# Patient Record
Sex: FEMALE | URBAN - METROPOLITAN AREA
[De-identification: names, ages, dates, MRNs, and addresses within clinical notes are randomized per-mention and may not be internally consistent; named-entity substitution may affect disease eponyms.]

---
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
## 2023-05-18 ENCOUNTER — APPOINTMENT (RX ONLY)
Dept: URBAN - METROPOLITAN AREA CLINIC 105 | Facility: CLINIC | Age: 18
Setting detail: DERMATOLOGY
End: 2023-05-18

## 2023-05-18 DIAGNOSIS — D18.0 HEMANGIOMA: ICD-10-CM

## 2023-05-18 DIAGNOSIS — L81.4 OTHER MELANIN HYPERPIGMENTATION: ICD-10-CM

## 2023-05-18 DIAGNOSIS — L81.3 CAFÉ AU LAIT SPOTS: ICD-10-CM

## 2023-05-18 DIAGNOSIS — L57.8 OTHER SKIN CHANGES DUE TO CHRONIC EXPOSURE TO NONIONIZING RADIATION: ICD-10-CM | Status: STABLE

## 2023-05-18 DIAGNOSIS — D22 MELANOCYTIC NEVI: ICD-10-CM

## 2023-05-18 DIAGNOSIS — Z87.2 PERSONAL HISTORY OF DISEASES OF THE SKIN AND SUBCUTANEOUS TISSUE: ICD-10-CM

## 2023-05-18 DIAGNOSIS — Z71.89 OTHER SPECIFIED COUNSELING: ICD-10-CM

## 2023-05-18 PROBLEM — D22.5 MELANOCYTIC NEVI OF TRUNK: Status: ACTIVE | Noted: 2023-05-18

## 2023-05-18 PROBLEM — D18.01 HEMANGIOMA OF SKIN AND SUBCUTANEOUS TISSUE: Status: ACTIVE | Noted: 2023-05-18

## 2023-05-18 PROBLEM — D22.61 MELANOCYTIC NEVI OF RIGHT UPPER LIMB, INCLUDING SHOULDER: Status: ACTIVE | Noted: 2023-05-18

## 2023-05-18 PROCEDURE — ? COUNSELING

## 2023-05-18 PROCEDURE — 99213 OFFICE O/P EST LOW 20 MIN: CPT

## 2023-05-18 PROCEDURE — ? SUNSCREEN RECOMMENDATIONS

## 2023-05-18 ASSESSMENT — LOCATION DETAILED DESCRIPTION DERM
LOCATION DETAILED: INFERIOR THORACIC SPINE
LOCATION DETAILED: RIGHT MEDIAL FOREHEAD
LOCATION DETAILED: EPIGASTRIC SKIN
LOCATION DETAILED: LEFT PROXIMAL DORSAL FOREARM
LOCATION DETAILED: INFERIOR MID FOREHEAD
LOCATION DETAILED: LEFT DISTAL DORSAL FOREARM
LOCATION DETAILED: RIGHT INFERIOR ANTERIOR NECK
LOCATION DETAILED: RIGHT PROXIMAL DORSAL FOREARM
LOCATION DETAILED: PERIUMBILICAL SKIN
LOCATION DETAILED: LEFT INFERIOR MEDIAL UPPER BACK
LOCATION DETAILED: RIGHT LATERAL BUTTOCK
LOCATION DETAILED: SUPERIOR THORACIC SPINE
LOCATION DETAILED: RIGHT DISTAL DORSAL FOREARM

## 2023-05-18 ASSESSMENT — LOCATION SIMPLE DESCRIPTION DERM
LOCATION SIMPLE: RIGHT ANTERIOR NECK
LOCATION SIMPLE: UPPER BACK
LOCATION SIMPLE: INFERIOR FOREHEAD
LOCATION SIMPLE: LEFT FOREARM
LOCATION SIMPLE: RIGHT FOREARM
LOCATION SIMPLE: LEFT UPPER BACK
LOCATION SIMPLE: RIGHT FOREHEAD
LOCATION SIMPLE: ABDOMEN
LOCATION SIMPLE: RIGHT BUTTOCK

## 2023-05-18 ASSESSMENT — LOCATION ZONE DERM
LOCATION ZONE: ARM
LOCATION ZONE: NECK
LOCATION ZONE: TRUNK
LOCATION ZONE: FACE

## 2023-05-18 NOTE — PROCEDURE: SUNSCREEN RECOMMENDATIONS
Detail Level: Generalized
General Sunscreen Counseling: I recommended a broad spectrum sunscreen with a SPF of 50 or higher.  I explained that SPF 30 sunscreens block approximately 97 percent of the sun's harmful rays.  Sunscreens should be applied at least 15 minutes prior to expected sun exposure and then every 2 hours after that as long as sun exposure continues. If swimming or exercising sunscreen should be reapplied every 45 minutes to an hour after getting wet or sweating.  One ounce, or the equivalent of a shot glass full of sunscreen, is adequate to protect the skin not covered by a bathing suit. I also recommended a lip balm with a sunscreen as well. Sun protective clothing can be used in lieu of sunscreen but must be worn the entire time you are exposed to the sun's rays. ABCDEs/monthly SSE/FSE at least annually.
Detail Level: Zone
Products Recommended: La Roche-Posay and Elta MD
General Sunscreen Counseling: I recommended a broad spectrum sunscreen with a SPF of 30 or higher.  I explained that SPF 30 sunscreens block approximately 97 percent of the sun's harmful rays.  Sunscreens should be applied at least 15 minutes prior to expected sun exposure and then every 2 hours after that as long as sun exposure continues. If swimming or exercising sunscreen should be reapplied every 45 minutes to an hour after getting wet or sweating.  One ounce, or the equivalent of a shot glass full of sunscreen, is adequate to protect the skin not covered by a bathing suit. I also recommended a lip balm with a sunscreen as well. Sun protective clothing can be used in lieu of sunscreen but must be worn the entire time you are exposed to the sun's rays.

## 2024-01-18 ENCOUNTER — OFFICE VISIT (OUTPATIENT)
Dept: ORTHOPEDIC SURGERY | Age: 19
End: 2024-01-18
Payer: COMMERCIAL

## 2024-01-18 VITALS — WEIGHT: 8 LBS | BODY MASS INDEX: 1.42 KG/M2 | HEIGHT: 63 IN

## 2024-01-18 DIAGNOSIS — M25.562 LEFT KNEE PAIN, UNSPECIFIED CHRONICITY: Primary | ICD-10-CM

## 2024-01-18 PROCEDURE — 99204 OFFICE O/P NEW MOD 45 MIN: CPT | Performed by: ORTHOPAEDIC SURGERY

## 2024-01-23 NOTE — PROGRESS NOTES
modification, anti-inflammatory medications, cortisone injection, physical therapy.  From a surgical standpoint we could consider knee arthroscopy for diagnostic and therapeutic purposes and then proceed with either meniscus repair or partial meniscectomy based on the appearance.  We discussed the pros and cons associate with all these options.  She like to think about these options and perhaps return to further discuss in the future.    Greater than 45 minutes were spent with this encounter.  Time spent included evaluating the patient's chart prior to arrival.  Evaluating the patient in the office including history, physical examination, imaging reviewing, and counseling on next steps.  Lastly, time was spent discussing orders with my staff as well as providing documentation in the chart.                    Wiley Shaffer MD            Orthopaedic Surgery Sports Medicine and Arthroscopy            [facility]            Team Physician [facility] ([place])      Disclaimer:  This note was dictated with voice recognition software.  Though review and correction are routine, we apologize for any errors.

## 2024-01-24 ENCOUNTER — OFFICE VISIT (OUTPATIENT)
Dept: ORTHOPEDIC SURGERY | Age: 19
End: 2024-01-24
Payer: COMMERCIAL

## 2024-01-24 ENCOUNTER — TELEPHONE (OUTPATIENT)
Dept: ORTHOPEDIC SURGERY | Age: 19
End: 2024-01-24

## 2024-01-24 VITALS — HEIGHT: 63 IN | BODY MASS INDEX: 23.04 KG/M2 | WEIGHT: 130 LBS

## 2024-01-24 DIAGNOSIS — M25.562 LEFT KNEE PAIN, UNSPECIFIED CHRONICITY: Primary | ICD-10-CM

## 2024-01-24 DIAGNOSIS — S83.242A ACUTE MEDIAL MENISCUS TEAR OF LEFT KNEE, INITIAL ENCOUNTER: Primary | ICD-10-CM

## 2024-01-24 PROCEDURE — 99215 OFFICE O/P EST HI 40 MIN: CPT | Performed by: ORTHOPAEDIC SURGERY

## 2024-01-24 RX ORDER — ALBUTEROL SULFATE 90 UG/1
AEROSOL, METERED RESPIRATORY (INHALATION)
COMMUNITY
Start: 2022-04-06

## 2024-01-24 NOTE — PROGRESS NOTES
2024     Reason for visit:  Left knee pain following injury on 2024    History of Present Illness:  The patient is a 18-year-old female who presents for evaluation of her left knee.  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  She reports on and off knee pain for 6 to 12 months with recent worsening.  She reports that she injured herself on a diving board on 2024.  Ever since then she has had increased amounts of medial knee pain.  The pain is made worse with diving and strenuous activities.  Occasional sense of catching.  Occasional swelling.    The patient returns for evaluation.  She does report reinjuring her knee within the past week.  She did twist her knee.  Ever since then she has had increasing muscle pain medially.  She has more difficulty with bearing weight.  She also reports increased evidence of instability, catching and locking.    Objective:  Ht 1.6 m (5' 3\")   Wt 59 kg (130 lb)   BMI 23.03 kg/m²      Physical Exam:  The patient is well-appearing and in no apparent distress  Examination of the left knee   There is a trace effusion, no gross deformity or skin changes  Range of motion reveals 0 to 130  Neg lachman, negative posterior drawer, no pain or laxity with varus or valgus stress at 0 degrees and 30 degrees of flexion  + medial joint line tenderness  5 out of 5 strength throughout distal muscle groups  Sensation is intact to light touch throughout all distributions  There is no calf swelling or tenderness  Palpable DP pulse, brisk cap refill, 2+ symmetric reflexes     Imagin view x-rays of the left knee were obtained the office today on 2024 and reviewed.  There is no fracture or dislocation.  No other abnormality.  MRI of the left knee was reviewed.  There is signal abnormality of the medial meniscus presents which may represent a tear versus intrasubstance degeneration      Assessment:  Left knee pain.  Concern for possible medial meniscus tear    Plan:  I had

## 2024-01-24 NOTE — PROGRESS NOTES
Name_______________________________________Printed:____________________  Date and time of surgery__2/2/2024__________0930____________Arrival Time:______0730__________   1. The instructions given regarding when and if a patient needs to stop oral intake prior to surgery varies.Follow the specific instructions you were given                  _x__Nothing to eat or to drink after Midnight the night before.                   ____Carbo loading or instructions will be given to select patients-if you have been given those instructions -please do the following                           The evening before your surgery after dinner before midnight drink 40 ounces of gatorade.If you are diabetic use sugar free.  The morning of surgery drink 40 ounces of water.This needs to be finished 3 hours prior to your surgery start time.    2. Take the following pills with a small sip of water on the morning of surgery___________________________________________________                  Do not take blood pressure medications ending in pril or sartan the abril prior to surgery or the morning of surgery. Dr Walsh's patient are not to take any medications the AM of surgery.         3. Aspirin, Ibuprofen, Advil, Naproxen, Vitamin E and other Anti-inflammatory products and supplements should be stopped for 5 -7days before surgery or as directed by your physician.   4. Check with your Doctor regarding stopping Plavix, Coumadin,Eliquis, Lovenox,Effient,Pradaxa,Xarelto, Fragmin or other blood thinners and follow their instructions.   5. Do not smoke, and do not drink any alcoholic beverages 24 hours prior to surgery.  This includes NA Beer.Refrain from the usage of any recreational drugs.   6. You may brush your teeth and gargle the morning of surgery.  DO NOT SWALLOW WATER   7. You MUST make arrangements for a responsible adult to stay on site while you are here and take you home after your surgery. You will not be allowed to leave alone or drive

## 2024-01-31 DIAGNOSIS — G89.18 POST-OP PAIN: Primary | ICD-10-CM

## 2024-01-31 RX ORDER — ASPIRIN 325 MG
325 TABLET ORAL DAILY
Qty: 14 TABLET | Refills: 0 | Status: SHIPPED | OUTPATIENT
Start: 2024-02-02

## 2024-01-31 RX ORDER — ONDANSETRON 4 MG/1
4 TABLET, FILM COATED ORAL EVERY 8 HOURS PRN
Qty: 21 TABLET | Refills: 0 | Status: SHIPPED | OUTPATIENT
Start: 2024-02-02

## 2024-01-31 RX ORDER — HYDROCODONE BITARTRATE AND ACETAMINOPHEN 5; 325 MG/1; MG/1
1 TABLET ORAL EVERY 4 HOURS PRN
Qty: 30 TABLET | Refills: 0 | Status: SHIPPED | OUTPATIENT
Start: 2024-02-02 | End: 2024-02-07

## 2024-02-01 NOTE — DISCHARGE INSTRUCTIONS
Orthopedic Surgery Discharge Instructions    Medications:   A pain medication has been prescribed for you.  Please take this as instructed by the pharmacist.  An anti-nausea medication has been prescribed for you to use as needed for nausea.  Either aspirin or a blood thinner medication may have been prescribed for you.  Please take this as instructed.    Activity:   weightbearing as tolerated.   Incision/dressings:  You may remove your dressing in 72 hours.  Until then the dressing needs to remain clean and dry.  Following removal dressing please apply dry dressing daily.  You may shower in 72 hours and allow the water to run over the incision.  However no submerging underwater or getting in pools.    Follow-up:  If you do not already have a postoperative follow-up appointment scheduled you should call to schedule an appointment within 10 to 14 days of surgery.    Emergency or after hours questions:  Please call the main call center at [phone] for all questions or concerns during evening and weekend hours.  The call center will direct your call to the on-call physician to answer your questions and concerns. During weekly office hours you may call my assistant, Kaitlin, at [phone].                           Wiley Shaffer MD            Orthopaedic Surgery Sports Medicine and Arthroscopy            [facility]            Team Physician [facility])      GENERAL SURGERY DISCHARGE INSTRUCTIONS    Follow your surgeons instructions.  Follow up with your surgeon as directed.  Observe the operative area for signs of excessive bleeding.If needed apply pressure,elevate if able and contact your surgeon.  Observe the operative site for any signs of infection- such as increased pain,redness,fever greater than 101 degrees,swelling, foul odor or drainage.Contact your surgeon  if any of these symptoms are present.  Keep operative site clean and dry.  Do not remove dressing unless instructed to by surgeon.  Apply ice as directed.  Avoid pulling,pushing or tugging to suture line.  If you become short of breath call your doctor or go to the ER.  Take medications as directed.  Pain medication should be taken with food.  Do not drive or operate machinery while taking narcotics.    ANESTHESIA DISCHARGE INSTRUCTIONS    Wear your seatbelt home.  You are under the influence of drugs-do not drink alcohol,drive,operate machinery,or make any important decisions or sign any legal documentsfor 24 hours  A responsible adult needs to be with you for 24 hours.  You may experience lightheadedness,dizziness,or sleepiness following surgery.  Rest at home today- increase activity as tolerated.  Progress slowly to a regular diet unless your physician has instructed you otherwise.Drink plenty of water.  If nausea becomes a problem call your physician.  Coughing,sore throat,and muscle aches are other side effects of anesthesia,and should improve with time.  Do not drive,operate machinery while taking narcotics.   Females of childbearing potential and on hormonal birth control, should use two forms of contraception following procedure if given a medication called sugammadex and/or emend. Additional contraception should be used for 7 days for sugammadex and/or 28 days for emend. These medications have a potential to reduce the effectiveness of hormonal birth control.

## 2024-02-01 NOTE — H&P
Reason for visit:  Left knee pain following injury on 2024     History of Present Illness:  The patient is a 18-year-old female who presents for evaluation of her left knee.  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  She reports on and off knee pain for 6 to 12 months with recent worsening.  She reports that she injured herself on a diving board on 2024.  Ever since then she has had increased amounts of medial knee pain.  The pain is made worse with diving and strenuous activities.  Occasional sense of catching.  Occasional swelling.     The patient returns for evaluation.  She does report reinjuring her knee within the past week.  She did twist her knee.  Ever since then she has had increasing muscle pain medially.  She has more difficulty with bearing weight.  She also reports increased evidence of instability, catching and locking.    Past Medical History:   Diagnosis Date    Asthma         Past Surgical History:   Procedure Laterality Date    RHINOPLASTY      TONSILLECTOMY           Objective:  Ht 1.6 m (5' 3\")   Wt 59 kg (130 lb)   BMI 23.03 kg/m²       Physical Exam:  The patient is well-appearing and in no apparent distress  CV-RRR  Pulm-CTAB    Examination of the left knee   There is a trace effusion, no gross deformity or skin changes  Range of motion reveals 0 to 130  Neg lachman, negative posterior drawer, no pain or laxity with varus or valgus stress at 0 degrees and 30 degrees of flexion  + medial joint line tenderness  5 out of 5 strength throughout distal muscle groups  Sensation is intact to light touch throughout all distributions  There is no calf swelling or tenderness  Palpable DP pulse, brisk cap refill, 2+ symmetric reflexes      Imagin view x-rays of the left knee were obtained the office today on 2024 and reviewed.  There is no fracture or dislocation.  No other abnormality.  MRI of the left knee was reviewed.  There is signal abnormality of the medial meniscus  presents which may represent a tear versus intrasubstance degeneration        Assessment:  Left knee pain.  Concern for possible medial meniscus tear     Plan:  I had a very long discussion with the patient.  We spent time reviewing imaging findings.  She does have worsening knee pain especially after this most recent event.  She also has joint line tenderness.  Whether or not the signal on the MRI represents a meniscus tear is to be determined.  Regardless treatment options include nonoperative as well as operative intervention.  From a nonoperative standpoint we could consider activity modification, anti-inflammatory medications, cortisone injection, physical therapy.  From a surgical standpoint we could consider knee arthroscopy for diagnostic and therapeutic purposes and then proceed with either meniscus repair or partial meniscectomy based on the appearance.  We discussed the pros and cons associate with all these options.  If we did proceed with repair of the meniscus then I would advocate for PRP injection for augmentation purposes.  I did tell the patient that there is a chance that we went in and the meniscus was not torn.  She is aware of this.  We thoroughly discussed the risk and benefits.  She does elect to proceed.

## 2024-02-02 ENCOUNTER — ANESTHESIA (OUTPATIENT)
Dept: OPERATING ROOM | Age: 19
End: 2024-02-02
Payer: COMMERCIAL

## 2024-02-02 ENCOUNTER — ANESTHESIA EVENT (OUTPATIENT)
Dept: OPERATING ROOM | Age: 19
End: 2024-02-02
Payer: COMMERCIAL

## 2024-02-02 ENCOUNTER — HOSPITAL ENCOUNTER (OUTPATIENT)
Age: 19
Setting detail: OUTPATIENT SURGERY
Discharge: HOME | End: 2024-02-02
Attending: ORTHOPAEDIC SURGERY | Admitting: ORTHOPAEDIC SURGERY
Payer: COMMERCIAL

## 2024-02-02 ENCOUNTER — TELEPHONE (OUTPATIENT)
Dept: ORTHOPEDIC SURGERY | Age: 19
End: 2024-02-02

## 2024-02-02 VITALS
SYSTOLIC BLOOD PRESSURE: 101 MMHG | WEIGHT: 141.13 LBS | DIASTOLIC BLOOD PRESSURE: 68 MMHG | HEART RATE: 73 BPM | HEIGHT: 63 IN | TEMPERATURE: 97.1 F | RESPIRATION RATE: 17 BRPM | OXYGEN SATURATION: 97 % | BODY MASS INDEX: 25.01 KG/M2

## 2024-02-02 LAB — HCG UR QL: NEGATIVE

## 2024-02-02 PROCEDURE — 84703 CHORIONIC GONADOTROPIN ASSAY: CPT

## 2024-02-02 PROCEDURE — 6370000000 HC RX 637 (ALT 250 FOR IP): Performed by: STUDENT IN AN ORGANIZED HEALTH CARE EDUCATION/TRAINING PROGRAM

## 2024-02-02 PROCEDURE — 3600000014 HC SURGERY LEVEL 4 ADDTL 15MIN: Performed by: ORTHOPAEDIC SURGERY

## 2024-02-02 PROCEDURE — 3700000000 HC ANESTHESIA ATTENDED CARE: Performed by: ORTHOPAEDIC SURGERY

## 2024-02-02 PROCEDURE — 6360000002 HC RX W HCPCS: Performed by: NURSE ANESTHETIST, CERTIFIED REGISTERED

## 2024-02-02 PROCEDURE — 7100000011 HC PHASE II RECOVERY - ADDTL 15 MIN: Performed by: ORTHOPAEDIC SURGERY

## 2024-02-02 PROCEDURE — 3700000001 HC ADD 15 MINUTES (ANESTHESIA): Performed by: ORTHOPAEDIC SURGERY

## 2024-02-02 PROCEDURE — 6360000002 HC RX W HCPCS: Performed by: STUDENT IN AN ORGANIZED HEALTH CARE EDUCATION/TRAINING PROGRAM

## 2024-02-02 PROCEDURE — 2500000003 HC RX 250 WO HCPCS: Performed by: ORTHOPAEDIC SURGERY

## 2024-02-02 PROCEDURE — 3600000004 HC SURGERY LEVEL 4 BASE: Performed by: ORTHOPAEDIC SURGERY

## 2024-02-02 PROCEDURE — 7100000000 HC PACU RECOVERY - FIRST 15 MIN: Performed by: ORTHOPAEDIC SURGERY

## 2024-02-02 PROCEDURE — 7100000010 HC PHASE II RECOVERY - FIRST 15 MIN: Performed by: ORTHOPAEDIC SURGERY

## 2024-02-02 PROCEDURE — 2580000003 HC RX 258: Performed by: NURSE ANESTHETIST, CERTIFIED REGISTERED

## 2024-02-02 PROCEDURE — 6360000002 HC RX W HCPCS: Performed by: ORTHOPAEDIC SURGERY

## 2024-02-02 PROCEDURE — 2709999900 HC NON-CHARGEABLE SUPPLY: Performed by: ORTHOPAEDIC SURGERY

## 2024-02-02 PROCEDURE — 7100000001 HC PACU RECOVERY - ADDTL 15 MIN: Performed by: ORTHOPAEDIC SURGERY

## 2024-02-02 PROCEDURE — 2500000003 HC RX 250 WO HCPCS: Performed by: NURSE ANESTHETIST, CERTIFIED REGISTERED

## 2024-02-02 PROCEDURE — 2580000003 HC RX 258: Performed by: ORTHOPAEDIC SURGERY

## 2024-02-02 RX ORDER — SODIUM CHLORIDE, SODIUM LACTATE, POTASSIUM CHLORIDE, CALCIUM CHLORIDE 600; 310; 30; 20 MG/100ML; MG/100ML; MG/100ML; MG/100ML
INJECTION, SOLUTION INTRAVENOUS CONTINUOUS PRN
Status: DISCONTINUED | OUTPATIENT
Start: 2024-02-02 | End: 2024-02-02 | Stop reason: SDUPTHER

## 2024-02-02 RX ORDER — ACETAMINOPHEN 325 MG/1
650 TABLET ORAL ONCE
Status: COMPLETED | OUTPATIENT
Start: 2024-02-02 | End: 2024-02-02

## 2024-02-02 RX ORDER — OXYCODONE HYDROCHLORIDE 5 MG/1
5 TABLET ORAL
Status: DISCONTINUED | OUTPATIENT
Start: 2024-02-02 | End: 2024-02-02 | Stop reason: HOSPADM

## 2024-02-02 RX ORDER — SODIUM CHLORIDE 0.9 % (FLUSH) 0.9 %
5-40 SYRINGE (ML) INJECTION PRN
Status: DISCONTINUED | OUTPATIENT
Start: 2024-02-02 | End: 2024-02-02 | Stop reason: HOSPADM

## 2024-02-02 RX ORDER — SODIUM CHLORIDE, SODIUM LACTATE, POTASSIUM CHLORIDE, CALCIUM CHLORIDE 600; 310; 30; 20 MG/100ML; MG/100ML; MG/100ML; MG/100ML
INJECTION, SOLUTION INTRAVENOUS CONTINUOUS
Status: DISCONTINUED | OUTPATIENT
Start: 2024-02-02 | End: 2024-02-02 | Stop reason: HOSPADM

## 2024-02-02 RX ORDER — KETOROLAC TROMETHAMINE 30 MG/ML
INJECTION, SOLUTION INTRAMUSCULAR; INTRAVENOUS PRN
Status: DISCONTINUED | OUTPATIENT
Start: 2024-02-02 | End: 2024-02-02 | Stop reason: SDUPTHER

## 2024-02-02 RX ORDER — ONDANSETRON 2 MG/ML
INJECTION INTRAMUSCULAR; INTRAVENOUS PRN
Status: DISCONTINUED | OUTPATIENT
Start: 2024-02-02 | End: 2024-02-02 | Stop reason: SDUPTHER

## 2024-02-02 RX ORDER — SODIUM CHLORIDE 9 MG/ML
INJECTION, SOLUTION INTRAVENOUS PRN
Status: DISCONTINUED | OUTPATIENT
Start: 2024-02-02 | End: 2024-02-02 | Stop reason: HOSPADM

## 2024-02-02 RX ORDER — SODIUM CHLORIDE, SODIUM LACTATE, POTASSIUM CHLORIDE, CALCIUM CHLORIDE 600; 310; 30; 20 MG/100ML; MG/100ML; MG/100ML; MG/100ML
INJECTION, SOLUTION INTRAVENOUS CONTINUOUS
Status: DISCONTINUED | OUTPATIENT
Start: 2024-02-02 | End: 2024-02-02

## 2024-02-02 RX ORDER — FENTANYL CITRATE 50 UG/ML
INJECTION, SOLUTION INTRAMUSCULAR; INTRAVENOUS PRN
Status: DISCONTINUED | OUTPATIENT
Start: 2024-02-02 | End: 2024-02-02 | Stop reason: SDUPTHER

## 2024-02-02 RX ORDER — FENTANYL CITRATE 50 UG/ML
50 INJECTION, SOLUTION INTRAMUSCULAR; INTRAVENOUS EVERY 5 MIN PRN
Status: DISCONTINUED | OUTPATIENT
Start: 2024-02-02 | End: 2024-02-02 | Stop reason: HOSPADM

## 2024-02-02 RX ORDER — MAGNESIUM SULFATE HEPTAHYDRATE 500 MG/ML
INJECTION, SOLUTION INTRAMUSCULAR; INTRAVENOUS PRN
Status: DISCONTINUED | OUTPATIENT
Start: 2024-02-02 | End: 2024-02-02 | Stop reason: SDUPTHER

## 2024-02-02 RX ORDER — DEXAMETHASONE SODIUM PHOSPHATE 4 MG/ML
INJECTION, SOLUTION INTRA-ARTICULAR; INTRALESIONAL; INTRAMUSCULAR; INTRAVENOUS; SOFT TISSUE PRN
Status: DISCONTINUED | OUTPATIENT
Start: 2024-02-02 | End: 2024-02-02 | Stop reason: SDUPTHER

## 2024-02-02 RX ORDER — LIDOCAINE HYDROCHLORIDE 10 MG/ML
0.5 INJECTION, SOLUTION EPIDURAL; INFILTRATION; INTRACAUDAL; PERINEURAL ONCE
Status: DISCONTINUED | OUTPATIENT
Start: 2024-02-02 | End: 2024-02-02 | Stop reason: HOSPADM

## 2024-02-02 RX ORDER — SODIUM CHLORIDE 0.9 % (FLUSH) 0.9 %
5-40 SYRINGE (ML) INJECTION EVERY 12 HOURS SCHEDULED
Status: DISCONTINUED | OUTPATIENT
Start: 2024-02-02 | End: 2024-02-02 | Stop reason: HOSPADM

## 2024-02-02 RX ORDER — ONDANSETRON 2 MG/ML
4 INJECTION INTRAMUSCULAR; INTRAVENOUS
Status: DISCONTINUED | OUTPATIENT
Start: 2024-02-02 | End: 2024-02-02 | Stop reason: HOSPADM

## 2024-02-02 RX ORDER — LIDOCAINE HYDROCHLORIDE 20 MG/ML
INJECTION, SOLUTION INFILTRATION; PERINEURAL PRN
Status: DISCONTINUED | OUTPATIENT
Start: 2024-02-02 | End: 2024-02-02 | Stop reason: SDUPTHER

## 2024-02-02 RX ORDER — APREPITANT 40 MG/1
40 CAPSULE ORAL ONCE
Status: COMPLETED | OUTPATIENT
Start: 2024-02-02 | End: 2024-02-02

## 2024-02-02 RX ORDER — HYDROMORPHONE HYDROCHLORIDE 2 MG/ML
0.5 INJECTION, SOLUTION INTRAMUSCULAR; INTRAVENOUS; SUBCUTANEOUS EVERY 5 MIN PRN
Status: DISCONTINUED | OUTPATIENT
Start: 2024-02-02 | End: 2024-02-02 | Stop reason: HOSPADM

## 2024-02-02 RX ORDER — PROPOFOL 10 MG/ML
INJECTION, EMULSION INTRAVENOUS PRN
Status: DISCONTINUED | OUTPATIENT
Start: 2024-02-02 | End: 2024-02-02 | Stop reason: SDUPTHER

## 2024-02-02 RX ORDER — MIDAZOLAM HYDROCHLORIDE 1 MG/ML
INJECTION INTRAMUSCULAR; INTRAVENOUS PRN
Status: DISCONTINUED | OUTPATIENT
Start: 2024-02-02 | End: 2024-02-02 | Stop reason: SDUPTHER

## 2024-02-02 RX ADMIN — DEXAMETHASONE SODIUM PHOSPHATE 8 MG: 4 INJECTION, SOLUTION INTRAMUSCULAR; INTRAVENOUS at 07:35

## 2024-02-02 RX ADMIN — APREPITANT 40 MG: 40 CAPSULE ORAL at 07:14

## 2024-02-02 RX ADMIN — CEFAZOLIN 2000 MG: 2 INJECTION, POWDER, FOR SOLUTION INTRAMUSCULAR; INTRAVENOUS at 07:23

## 2024-02-02 RX ADMIN — HYDROMORPHONE HYDROCHLORIDE 0.5 MG: 2 INJECTION, SOLUTION INTRAMUSCULAR; INTRAVENOUS; SUBCUTANEOUS at 08:27

## 2024-02-02 RX ADMIN — LIDOCAINE HYDROCHLORIDE 100 MG: 20 INJECTION, SOLUTION INFILTRATION; PERINEURAL at 07:27

## 2024-02-02 RX ADMIN — HYDROMORPHONE HYDROCHLORIDE 0.5 MG: 2 INJECTION, SOLUTION INTRAMUSCULAR; INTRAVENOUS; SUBCUTANEOUS at 08:21

## 2024-02-02 RX ADMIN — ONDANSETRON 4 MG: 2 INJECTION INTRAMUSCULAR; INTRAVENOUS at 07:35

## 2024-02-02 RX ADMIN — FENTANYL CITRATE 50 MCG: 50 INJECTION, SOLUTION INTRAMUSCULAR; INTRAVENOUS at 07:34

## 2024-02-02 RX ADMIN — KETOROLAC TROMETHAMINE 30 MG: 60 INJECTION, SOLUTION INTRAMUSCULAR at 08:05

## 2024-02-02 RX ADMIN — ACETAMINOPHEN 650 MG: 325 TABLET ORAL at 07:14

## 2024-02-02 RX ADMIN — MAGNESIUM SULFATE HEPTAHYDRATE 1 G: 500 INJECTION, SOLUTION INTRAMUSCULAR; INTRAVENOUS at 07:35

## 2024-02-02 RX ADMIN — MIDAZOLAM 2 MG: 1 INJECTION INTRAMUSCULAR; INTRAVENOUS at 07:22

## 2024-02-02 RX ADMIN — FENTANYL CITRATE 50 MCG: 50 INJECTION, SOLUTION INTRAMUSCULAR; INTRAVENOUS at 07:27

## 2024-02-02 RX ADMIN — SODIUM CHLORIDE, POTASSIUM CHLORIDE, SODIUM LACTATE AND CALCIUM CHLORIDE: 600; 310; 30; 20 INJECTION, SOLUTION INTRAVENOUS at 07:22

## 2024-02-02 RX ADMIN — SODIUM CHLORIDE, POTASSIUM CHLORIDE, SODIUM LACTATE AND CALCIUM CHLORIDE: 600; 310; 30; 20 INJECTION, SOLUTION INTRAVENOUS at 06:39

## 2024-02-02 RX ADMIN — PROPOFOL 140 MG: 10 INJECTION, EMULSION INTRAVENOUS at 07:27

## 2024-02-02 ASSESSMENT — PAIN DESCRIPTION - ORIENTATION
ORIENTATION: LEFT

## 2024-02-02 ASSESSMENT — PAIN DESCRIPTION - DESCRIPTORS
DESCRIPTORS: BURNING
DESCRIPTORS: PRESSURE
DESCRIPTORS: BURNING

## 2024-02-02 ASSESSMENT — PAIN DESCRIPTION - LOCATION
LOCATION: KNEE

## 2024-02-02 ASSESSMENT — PAIN SCALES - GENERAL
PAINLEVEL_OUTOF10: 2
PAINLEVEL_OUTOF10: 7
PAINLEVEL_OUTOF10: 7

## 2024-02-02 ASSESSMENT — PAIN - FUNCTIONAL ASSESSMENT
PAIN_FUNCTIONAL_ASSESSMENT: 0-10

## 2024-02-02 NOTE — PROGRESS NOTES
Pt arrived to PACU bay 10 per stretcher.  Unarousable @ this time.  Oral airway in place, on 6 lpm via SM. VSS.  Resp easy/reg.  Bedside report received from OR RN/CRNA.

## 2024-02-02 NOTE — TELEPHONE ENCOUNTER
Dad wanting to confirm a few things:  1) downtime after sx. I reviewed the sx on the chart. There was no meniscus repair or repair otherwise. Debridement does not require a \"downtime\" per say. It would be resume activities to her tolerance and ability. 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  Should she be concerned that activity is going to make her pain worse?  Timeline for being able to compete later this month may be realistic given her pain, tolerance, and strength allows for it.  2) If no tear, what is the cause of her pain/what is inflamed within her knee joint causing her discomfort? Is it a specific muscle or ligament.   I let dad I would relay these questions. Edmund returns in office 2/8. I can relay response for these questions, but if further questions we can address those at patient f/u appt 2/14.  Dad was fine with this plan.

## 2024-02-02 NOTE — ANESTHESIA POSTPROCEDURE EVALUATION
Department of Anesthesiology  Postprocedure Note    Patient: Jacquie Rehman  MRN: [id number]  YOB: 2005  Date of evaluation: 2/2/2024    Procedure Summary       Date: 02/02/24 Room / Location: [address]    Anesthesia Start: 0724 Anesthesia Stop: 0808    Procedure: LEFT KNEE ARTHROSCOPY, DEBRIDEMENT (Left: Knee) Diagnosis:       Tear of medial meniscus of left knee, unspecified tear type, unspecified whether old or current tear, initial encounter      (Tear of medial meniscus of left knee, unspecified tear type, unspecified whether old or current tear, initial encounter [S83.242A])    Surgeons: Wiley Shaffer MD Responsible Provider: Savanna Grant MD    Anesthesia Type: general ASA Status: 2            Anesthesia Type: No value filed.    Mara Phase I: Mara Score: 9    Mara Phase II: Mara Score: 10    Anesthesia Post Evaluation    Patient location during evaluation: bedside  Patient participation: complete - patient participated  Level of consciousness: awake and alert  Pain score: 1  Airway patency: patent  Nausea & Vomiting: no vomiting  Cardiovascular status: hemodynamically stable  Respiratory status: nonlabored ventilation  Hydration status: stable  Multimodal analgesia pain management approach  Pain management: adequate    No notable events documented.

## 2024-02-02 NOTE — PROGRESS NOTES
Teaching / education initiated regarding perioperative experience, expectations, and pain management during stay. Patient verbalized understanding.    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 Akhtar)\par \par Currently\par Has no fever, no urinary symptoms except nocturia: 3- times/night at present.\par Home blood pressure, temp and weight flow sheet reviewed.\par Has elevated blood pressure at home\par Adherence and understanding: Good.\par \par Functional /Employment status: Reviewed. Employed back at work now.\par Cardiologist:Dr. Junior Bazan\par

## 2024-02-02 NOTE — TELEPHONE ENCOUNTER
Other PATIENTS FATHER CALLED IN AND HE IS REQUESTING TO SPEAK WITH SOMEONE IN THE OFFICE IN REGARDS TO DAUGHTERS SURGERY    HE STATES HE HAS SOME CLARIFYING QUESTIONS    HE CAN BE REACHED [phone]

## 2024-02-02 NOTE — ANESTHESIA PRE PROCEDURE
Department of Anesthesiology  Preprocedure Note       Name:  Jacquie Rehman   Age:  18 y.o.  :  2005                                          MRN:  [id number]         Date:  2024      Surgeon: Surgeon(s):  Wiley Shaffer MD    Procedure: Procedure(s):  LEFT KNEE ARTHROSCOPY, MEDIAL  MENISCUS REPAIR-ARTHREX  POSSIBLE PLATELET RICH PLASMA AUGMENTATION    Medications prior to admission:   Prior to Admission medications    Medication Sig Start Date End Date Taking? Authorizing Provider   HYDROcodone-acetaminophen (NORCO) 5-325 MG per tablet Take 1 tablet by mouth every 4 hours as needed for Pain for up to 5 days. Intended supply: 5 days. Take lowest dose possible to manage pain Max Daily Amount: 6 tablets  Patient not taking: Reported on 2024  Angel Lisa PA-C   ondansetron (ZOFRAN) 4 MG tablet Take 1 tablet by mouth every 8 hours as needed for Nausea  Patient not taking: Reported on 2024   Angel Lisa PA-C   aspirin 325 MG tablet Take 1 tablet by mouth daily  Patient not taking: Reported on 2024   Angel Lisa PA-C   albuterol sulfate HFA (PROVENTIL;VENTOLIN;PROAIR) 108 (90 Base) MCG/ACT inhaler SMARTSI Puff(s) By Mouth Every 6 Hours PRN 22   ProviderAugustus MD   Levonorgestrel (KYLEENA) IUD 19.5 mg 1 Intra Uterine Device by IntraUTERine route once  Patient not taking: Reported on 2024   ProviderAugustus MD       Current medications:    Current Facility-Administered Medications   Medication Dose Route Frequency Provider Last Rate Last Admin    lactated ringers IV soln infusion   IntraVENous Continuous Wiley Shaffer MD 50 mL/hr at 24 0639 New Bag at 24 0639    lidocaine PF 1 % injection 0.5 mL  0.5 mL IntraDERmal Once Wiley Shaffer MD        ceFAZolin (ANCEF) 2,000 mg in sodium chloride 0.9 % 50 mL IVPB (mini-bag)  2,000 mg IntraVENous On Call to OR Wiley Shaffer  mL/hr at 24 0640 2,000

## 2024-02-02 NOTE — PROGRESS NOTES
Pt dressed and ready for discharge.  VSS. Pain tolerable, denies nausea.  All belongings returned to patient.  Instructions reviewed with pt and father, Pierre.  All questions answered, verbalized understanding.  Pt left department via wheelchair to private vehicle.

## 2024-02-05 ENCOUNTER — HOSPITAL ENCOUNTER (OUTPATIENT)
Dept: PHYSICAL THERAPY | Age: 19
Setting detail: THERAPIES SERIES
Discharge: HOME | End: 2024-02-05
Attending: ORTHOPAEDIC SURGERY
Payer: COMMERCIAL

## 2024-02-05 PROCEDURE — 97110 THERAPEUTIC EXERCISES: CPT

## 2024-02-05 PROCEDURE — 97161 PT EVAL LOW COMPLEX 20 MIN: CPT

## 2024-02-05 NOTE — FLOWSHEET NOTE
[facility] [address] [place] [address] office: [phone] fax: [phone]      Physical Therapy: TREATMENT/PROGRESS NOTE   Patient: Jacquie Rehman (18 y.o. female)   Treatment Date: 2024   :  2005 MRN: [id number]   Visit #: 1   Insurance Allowable Auth Needed   [employer]/1 per day []Yes    [x]No    Insurance: Payor: AETNA / Plan: AETNA / Product Type: *No Product type* /   Insurance ID: [id number] - (Commercial)  Secondary Insurance (if applicable): MARBELLA SHI   Treatment Diagnosis: L knee pain M25.562  No diagnosis found.   Medical Diagnosis:    Left knee pain, unspecified chronicity [M25.562]   Referring Physician: Wiley Shaffer MD  PCP: Unknown, Provider, DO                             Plan of care signed: NO    Date of Patient follow up with Physician:      Progress Report/POC: EVAL today  POC update due: (10 visits /OR AUTH LIMITS, whichever is less)  3/6/2024       Preferred Language for Healthcare:   [x]English       []other:    SUBJECTIVE EXAMINATION     Patient Report/Comments: see eval     Test used Initial score  2024   Pain Summary VAS 1-6/10    Functional questionnaire LEFS 70%    Other:                OBJECTIVE EXAMINATION     Observation: See Eval    Test measurements: See Eval    Exercises/Interventions:     Therapeutic Ex (05377) 15 resistance Sets/time Reps Notes/Cues/Progressions   SLR  1 15    SLR with ER  1 15    SL hip abd  1 15    Standing h/s curl  1 15    LAQ  10 10    Heel slide  10 10                                Manual Intervention (40666) 5  TIME     STM adductors and medial knee  5                                 NMR re-education (09165) resistance Sets/time Reps CUES NEEDED                                      Therapeutic Activity (18560)  Sets/time                                          Modalities:    No modalities applied this session  Vasopneumatic Compression (Game Ready):
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
## 2024-02-07 ENCOUNTER — HOSPITAL ENCOUNTER (OUTPATIENT)
Dept: PHYSICAL THERAPY | Age: 19
Setting detail: THERAPIES SERIES
Discharge: HOME | End: 2024-02-07
Attending: ORTHOPAEDIC SURGERY
Payer: COMMERCIAL

## 2024-02-07 PROCEDURE — 97110 THERAPEUTIC EXERCISES: CPT

## 2024-02-07 NOTE — TELEPHONE ENCOUNTER
After further discussion with Dr. Shaffer, no meniscus tear on exam. No restrictions from an orthopedic surgery standpoint for RTP. I let dad know to work with PT and ATC on RTP and progression based on hr tolerance.  Dad was appreciative for this info. All questions answered. Dad expressed understanding.

## 2024-02-07 NOTE — FLOWSHEET NOTE
[facility] - Outpatient Rehabilitation and Therapy [address] [place] [address] office: [phone] fax: [phone]      Physical Therapy: TREATMENT/PROGRESS NOTE   Patient: Jacquie Rehman (18 y.o. female)   Treatment Date: 2024   :  2005 MRN: [id number]   Visit #: 2   Insurance Allowable Auth Needed   Aetna/1 per day []Yes    [x]No    Insurance: Payor: AETNA / Plan: AETNA / Product Type: *No Product type* /   Insurance ID: [id number] - (Commercial)  Secondary Insurance (if applicable): MARBELLA SHI   Treatment Diagnosis: L knee pain M25.562  No diagnosis found.   Medical Diagnosis:    Left knee pain, unspecified chronicity [M25.562]   Referring Physician: Wiley Shaffer MD  PCP: Unknown, Provider, DO                             Plan of care signed: NO    Date of Patient follow up with Physician:      Progress Report/POC: NO  POC update due: (10 visits /OR AUTH LIMITS, whichever is less)  3/8/2024       Preferred Language for Healthcare:   [x]English       []other:    SUBJECTIVE EXAMINATION     Patient Report/Comments: see eval     Test used Initial score  2024   Pain Summary VAS 1-6/10    Functional questionnaire LEFS 70%    Other:                OBJECTIVE EXAMINATION     Observation: See Eval    Test measurements: See Eval    Exercises/Interventions:     Therapeutic Ex (82926) 15 resistance Sets/time Reps Notes/Cues/Progressions   SLR  1 15    SLR with ER  1 15    SL hip abd/ clam  5\" 2 15    Standing h/s curl  1 15    LAQ  10 10    Heel slide  10 10    Bridges  10 10    EOB hip ext 5\" 2 10    Sport cord  10 Fwd/bwd   Bosu lunge 5\" 2 10    Leg press  2 10 70# left bias ecc phase          Manual Intervention (02372) 5  TIME     STM adductors and medial knee  5                                 NMR re-education (81375) resistance Sets/time Reps CUES NEEDED                                      Therapeutic Activity (26152)  Sets/time

## 2024-02-09 NOTE — OP NOTE
Operative Note      Patient: Jacquie Rehman  YOB: 2005  MRN: [id number]    Date of Procedure: 2/2/2024    Pre-Op Diagnosis Codes:     * Tear of medial meniscus of left knee, unspecified tear type, unspecified whether old or current tear, initial encounter [S83.242A]    Post-Op Diagnosis: Same       Procedure(s):  LEFT KNEE ARTHROSCOPY, DEBRIDEMENT  Diffuse 3 compartment synovectomy    Surgeon(s):  Wiley Shaffer MD    Assistant:   First Assistant: Rigoberto Noel RN  Fellow: Ritesh Santos MD    Anesthesia: General    Estimated Blood Loss (mL): Minimal    Complications: None    Specimens:   * No specimens in log *    Implants:  * No implants in log *      Drains: * No LDAs found *    Findings: per dictation        Detailed Description of Procedure:   The patient was met in the preoperative holding area.  surgical site was identified and marked.  Informed consent was obtained.  Patient was taken back to the operative room placed on table supine position.  General anesthesia was performed.   The knee was examined.  He had a full range of motion passively.  There is no evidence of ligamentous instability with a grade 1A Lachman and stable posterior drawer.  No laxity with varus or valgus stress at full extension as well as 30 degrees of flexion.  Thigh tourniquet was placed.  Lower extremity was prepped and draped using sterile technique.  Preop of antibiotics were given within 30 minutes of skin incision.  Formal timeout was performed in which the correct patient, surgical site, and procedure was reaffirmed.  The Tom was insufflated to a 250 mmHg.       Anterior inferior lateral incision was made.  Arthroscope was introduced into the notch and the patellofemoral joint.  Diagnostic arthroscopy was performed. Patella and Trochlear cartilage was intact.  There were no loose bodies in the medial or lateral gutters.   Medial compartment entered.    There was no evidence of chondral or meniscus  abnormality.  The notch was evaluated.  ACL and PCL were intact.  Lateral compartment is entered.  There was no meniscus or chondral abnormality.      At that point we did use a spinal needle to gently trephinate the medial meniscus given the preoperative degenerative signal within the central portion of the meniscus.  However again there is no evidence of derangement of the meniscus upon arthroscopic evaluation and probing.  There was evidence of mild synovitis throughout the knee and the infrapatellar as well as the medial lateral and patellofemoral compartments and therefore an arthroscopic shaver was utilized to perform a diffuse 3 compartment synovectomy.    The knee was thoroughly irrigated.  All instruments removed.  Local anesthesia was injected the portal sites.  Closure was performed with a 4-0 Monocryl in buried fashion.  Sterile dressings were provided in form of Xeroform, 4 x 4's, ABD, Ace.  Patient successfully extubated taken covering excellent condition.  At the end of procedure all counts correct.  I was present for the entire case.    Electronically signed by Wiley Shaffer MD on 2/9/2024 at 9:48 AM

## 2024-02-12 ENCOUNTER — HOSPITAL ENCOUNTER (OUTPATIENT)
Dept: PHYSICAL THERAPY | Age: 19
Setting detail: THERAPIES SERIES
Discharge: HOME | End: 2024-02-12
Attending: ORTHOPAEDIC SURGERY
Payer: COMMERCIAL

## 2024-02-12 PROCEDURE — 97140 MANUAL THERAPY 1/> REGIONS: CPT

## 2024-02-12 PROCEDURE — 97110 THERAPEUTIC EXERCISES: CPT

## 2024-02-12 NOTE — FLOWSHEET NOTE
[facility] - Outpatient Rehabilitation and Therapy [address] [place] [address] office: [phone] fax: [phone]      Physical Therapy: TREATMENT/PROGRESS NOTE   Patient: Jacquie Rehman (18 y.o. female)   Treatment Date: 2024   :  2005 MRN: [id number]   Visit #: 3   Insurance Allowable Auth Needed   Aetna/1 per day []Yes    [x]No    Insurance: Payor: AETNA / Plan: AETNA / Product Type: *No Product type* /   Insurance ID: [id number] - (Commercial)  Secondary Insurance (if applicable): MARBELLA SHI   Treatment Diagnosis: L knee pain M25.562  No diagnosis found.   Medical Diagnosis:    Left knee pain, unspecified chronicity [M25.562]   Referring Physician: Wiley Shaffer MD  PCP: Unknown, Provider, DO                             Plan of care signed: NO    Date of Patient follow up with Physician:      Progress Report/POC: NO  POC update due: (10 visits /OR AUTH LIMITS, whichever is less)  3/13/2024       Preferred Language for Healthcare:   [x]English       []other:    SUBJECTIVE EXAMINATION     Patient Report/Comments: Did well with ATC over the weekend. No soreness and swelling is improving.      Test used Initial score  2024   Pain Summary VAS 1-6/10    Functional questionnaire LEFS 70%    Other:                OBJECTIVE EXAMINATION     Observation: See Eval    Test measurements: See Eval    Exercises/Interventions:     Therapeutic Ex (79603) 30 resistance Sets/time Reps Notes/Cues/Progressions   Bike  5     Side stepping Red, AK 2 2    Spacer fwd lunges  10 10    Spacer lateral lunges  10 10    Spacer lunge with CL hip ext  1 10 Very shaky   Step up 12 in 1 10    Step up lateral 8 in 1 10    Retro step down 8 in 1 10    Thai squat 18 in 1 10    TRX SL squat  12 in 1 15    Multi angle lunge with slider       SL RDL 20 lb 1 10    Leg press  2 10 70# left bias ecc phase          Manual Intervention (01029) 10  TIME     STM IT band  5     Patellar mobs

## 2024-02-14 ENCOUNTER — OFFICE VISIT (OUTPATIENT)
Dept: ORTHOPEDIC SURGERY | Age: 19
End: 2024-02-14

## 2024-02-14 VITALS — HEIGHT: 63 IN | BODY MASS INDEX: 24.98 KG/M2 | WEIGHT: 141 LBS

## 2024-02-14 DIAGNOSIS — M25.562 LEFT KNEE PAIN, UNSPECIFIED CHRONICITY: Primary | ICD-10-CM

## 2024-02-14 PROCEDURE — 99024 POSTOP FOLLOW-UP VISIT: CPT | Performed by: ORTHOPAEDIC SURGERY

## 2024-02-15 ENCOUNTER — HOSPITAL ENCOUNTER (OUTPATIENT)
Dept: PHYSICAL THERAPY | Age: 19
Setting detail: THERAPIES SERIES
Discharge: HOME | End: 2024-02-15
Attending: ORTHOPAEDIC SURGERY
Payer: COMMERCIAL

## 2024-02-15 PROCEDURE — 97110 THERAPEUTIC EXERCISES: CPT

## 2024-02-15 PROCEDURE — 97112 NEUROMUSCULAR REEDUCATION: CPT

## 2024-02-15 NOTE — FLOWSHEET NOTE
[facility] - Outpatient Rehabilitation and Therapy [address] [place] [address] office: [phone] fax: [phone]      Physical Therapy: TREATMENT/PROGRESS NOTE   Patient: Jacquie Rehman (18 y.o. female)   Treatment Date: 02/15/2024   :  2005 MRN: [id number]   Visit #: 4   Insurance Allowable Auth Needed   Aetna/1 per day []Yes    [x]No    Insurance: Payor: AETNA / Plan: AETNA / Product Type: *No Product type* /   Insurance ID: [id number] - (Commercial)  Secondary Insurance (if applicable): MARBELLA SHI   Treatment Diagnosis: L knee pain M25.562  No diagnosis found.   Medical Diagnosis:    Left knee pain, unspecified chronicity [M25.562]   Referring Physician: Wiley Shaffer MD  PCP: Unknown, Provider, DO                             Plan of care signed: NO    Date of Patient follow up with Physician:      Progress Report/POC: NO  POC update due: (10 visits /OR AUTH LIMITS, whichever is less)  3/15/2024       Preferred Language for Healthcare:   [x]English       []other:    SUBJECTIVE EXAMINATION     Patient Report/Comments: Did well with ATC yesterday. Able to start some light plyos without issue.  No soreness and swelling is improving.      Test used Initial score  2/5/24 02/15/2024   Pain Summary VAS 1-6/10    Functional questionnaire LEFS 70%    Other:                OBJECTIVE EXAMINATION     Observation: See Eval    Test measurements: See Eval    Exercises/Interventions:     Therapeutic Ex (47480) 15 resistance Sets/time Reps Notes/Cues/Progressions   Bike  5     Side stepping Red, AK 2 2    Spacer fwd lunges  10 10    Spacer lateral lunges  10 10    Very shaky            10    15       SL RDL 15 lb 1 10    70# left bias ecc phase          Manual Intervention (72505) 0  TIME     STM IT band  5     Patellar mobs  5                          NMR re-education (46358) 30 resistance Sets/time Reps CUES NEEDED   Wall taps  2 10    Line jumps forward and lateral ( 2 legged)  2

## 2024-02-19 ENCOUNTER — HOSPITAL ENCOUNTER (OUTPATIENT)
Dept: PHYSICAL THERAPY | Age: 19
Setting detail: THERAPIES SERIES
Discharge: HOME | End: 2024-02-19
Attending: ORTHOPAEDIC SURGERY
Payer: COMMERCIAL

## 2024-02-19 PROCEDURE — 97112 NEUROMUSCULAR REEDUCATION: CPT

## 2024-02-19 PROCEDURE — 97110 THERAPEUTIC EXERCISES: CPT

## 2024-02-19 NOTE — FLOWSHEET NOTE
code that is of complexity and severity that require skilled therapeutic intervention. This has a direct and significant impact on the need for therapy and significantly impacts the rate of recovery.     Treatment/Activity Tolerance:  [x] Patient tolerated treatment well [] Patient limited by fatique  [] Patient limited by pain  [] Patient limited by other medical complications  [] Other:     Return to Play: NA    Prognosis for POC: [x] Good [] Fair  [] Poor    Patient requires continued skilled intervention: [x] Yes  [] No    GOALS     atient stated goal: get back to diving  Status: [] Progressing: [] Met: [] Not Met: [] Adjusted    Therapist goals for Patient:   Short Term Goals: To be achieved in: 2 weeks  Independent in HEP and progression per patient tolerance, in order to progress toward full function and prevent re-injury.    Status: [] Progressing: [] Met: [] Not Met: [] Adjusted  Patient will have a decrease in pain to 0/10 to help facilitate improvement in movement, function, and ADLs as indicated by functional deficits.   Status: [] Progressing: [] Met: [] Not Met: [] Adjusted    Long Term Goals: To be achieved in: 2 weeks  Disability index score of 20% or less for the LEFS to assist with return top prior level of function.    Status: [] Progressing: [] Met: [] Not Met: [] Adjusted  Improve  knee AROM  Flexion and extension to 0-140 degrees or  better to allow for proper joint functioning as indicated by patients functional deficits.  Status: [] Progressing: [] Met: [] Not Met: [] Adjusted  Pt to improve strength to show motor control/activation of proximal hip, quadriceps, and hamstrings to facilitate functional mobility and ADLs.   Status: [] Progressing: [] Met: [] Not Met: [] Adjusted  Patient will return to squatting, walk 1 mile, up/down 1 flight of stairs, hopping, and running without increased symptoms or restriction to work towards return to prior level of function.  Status: [] Progressing: []

## 2024-02-19 NOTE — PROGRESS NOTES
2/14/2024     Reason for visit:  Status post left knee arthroscopy with synovectomy on 2/2/2024    History of Present Illness:  Patient returns for postoperative evaluation.  Overall she is doing well.  She is increasing her activity level as tolerated.  She is happy with her progress.  Objective:  Ht 1.6 m (5' 3\")   Wt 64 kg (141 lb)   LMP 01/20/2024   BMI 24.98 kg/m²      Physical Exam:  The patient is well-appearing and in no apparent distress  Examination of the left knee   There is a trace effusion, no gross deformity or skin changes  Range of motion reveals 0 to 130  neg lachman, negative posterior drawer, no pain or laxity with varus or valgus stress at 0 degrees and 30 degrees of flexion  no joint line tenderness  5 out of 5 strength throughout distal muscle groups  Sensation is intact to light touch throughout all distributions  There is no calf swelling or tenderness  Palpable DP pulse, brisk cap refill, 2+ symmetric reflexes     Assessment:  Status post left knee arthroscopy with synovectomy on 2/2/2024    Plan:  Patient is doing well.  Continue to increase activity level as tolerated.  Gradually return to competitive competition.  Return to see me in roughly 4 weeks.                   Wiley Shaffer MD            Orthopaedic Surgery Sports Medicine and Arthroscopy            [facility]            Team Physician [facility] ([place]      Disclaimer:  This note was dictated with voice recognition software.  Though review and correction are routine, we apologize for any errors.

## 2024-02-22 ENCOUNTER — HOSPITAL ENCOUNTER (OUTPATIENT)
Dept: PHYSICAL THERAPY | Age: 19
Setting detail: THERAPIES SERIES
End: 2024-02-22
Attending: ORTHOPAEDIC SURGERY
Payer: COMMERCIAL

## 2024-03-04 ENCOUNTER — HOSPITAL ENCOUNTER (OUTPATIENT)
Dept: PHYSICAL THERAPY | Age: 19
Setting detail: THERAPIES SERIES
Discharge: HOME | End: 2024-03-04
Attending: ORTHOPAEDIC SURGERY
Payer: COMMERCIAL

## 2024-03-04 PROCEDURE — 97110 THERAPEUTIC EXERCISES: CPT

## 2024-03-04 PROCEDURE — 97140 MANUAL THERAPY 1/> REGIONS: CPT

## 2024-03-04 PROCEDURE — 97112 NEUROMUSCULAR REEDUCATION: CPT

## 2024-03-04 NOTE — FLOWSHEET NOTE
[facility] - Outpatient Rehabilitation and Therapy [address] [place] [address] office: [phone] fax: [phone]      Physical Therapy: TREATMENT/PROGRESS NOTE   Patient: Jacquie Rehman (18 y.o. female)   Treatment Date: 2024   :  2005 MRN: [id number]   Visit #: 6   Insurance Allowable Auth Needed   Aetna/1 per day []Yes    [x]No    Insurance: Payor: AETNA / Plan: AETNA / Product Type: *No Product type* /   Insurance ID: [id number] - (Commercial)  Secondary Insurance (if applicable): MARBELLA SHI   Treatment Diagnosis: L knee pain M25.562  No diagnosis found.   Medical Diagnosis:    Left knee pain, unspecified chronicity [M25.562]   Referring Physician: Wiley Shaffer MD  PCP: Unknown, Provider, DO                             Plan of care signed: NO    Date of Patient follow up with Physician:      Progress Report/POC: NO  POC update due: (10 visits /OR AUTH LIMITS, whichever is less)  4/3/2024       Preferred Language for Healthcare:   [x]English       []other:    SUBJECTIVE EXAMINATION     Patient Report/Comments: Did well while diving this past weekend. Will have another competition the weekend of the . Is a little sore along the inside of the knee. Noticed that by the end of the weekend her knee felt a little unstable from fatigue.      Test used Initial score  2024   Pain Summary VAS 1-6/10    Functional questionnaire LEFS 70%    Other:                OBJECTIVE EXAMINATION     Observation: no noted soft tissue restrictions    Test measurements:     Exercises/Interventions:     Therapeutic Ex (00713) 12 resistance Sets/time Reps Notes/Cues/Progressions   Bike  5     Side stepping Red, AK 2 2    Spacer fwd lunges + CL hip extension  10 10    Spacer lateral lunges  10 10    Walking lunges 15 lb 1 2    Very shaky            10    15          70# left bias ecc phase   Quick step up 12 in  2 15    Manual Intervention (63545) 8  TIME     STM IT band  0

## 2024-03-08 ENCOUNTER — HOSPITAL ENCOUNTER (OUTPATIENT)
Dept: PHYSICAL THERAPY | Age: 19
Setting detail: THERAPIES SERIES
Discharge: HOME | End: 2024-03-08
Attending: ORTHOPAEDIC SURGERY
Payer: COMMERCIAL

## 2024-03-08 PROCEDURE — 97140 MANUAL THERAPY 1/> REGIONS: CPT

## 2024-03-08 PROCEDURE — 97112 NEUROMUSCULAR REEDUCATION: CPT

## 2024-03-08 NOTE — FLOWSHEET NOTE
following either an injury or surgery.  (48224) MANUAL THERAPY -  Manual therapy techniques, 1 or more regions, each 15 minutes (Mobilization/manipulation, manual lymphatic drainage, manual traction) for the purpose of modulating pain, promoting relaxation,  increasing ROM, reducing/eliminating soft tissue swelling/inflammation/restriction, improving soft tissue extensibility and allowing for proper ROM for normal function with self care, mobility, lifting and ambulation  (87079) VASOPNEUMATIC    TREATMENT PLAN   Plan: Cont POC- Continue emphasis/focus on exercise progression. Next visit plan to progress reps and add new exercises     Electronically Signed by Dania Diallo PTA              Date: 03/08/2024     Note: If patient does not return for scheduled/recommended follow up visits, this note will serve as a discharge from care along with the most recent update on progress.

## 2024-03-11 ENCOUNTER — HOSPITAL ENCOUNTER (OUTPATIENT)
Dept: PHYSICAL THERAPY | Age: 19
Setting detail: THERAPIES SERIES
Discharge: HOME | End: 2024-03-11
Attending: ORTHOPAEDIC SURGERY
Payer: COMMERCIAL

## 2024-03-11 PROCEDURE — 97110 THERAPEUTIC EXERCISES: CPT

## 2024-03-11 PROCEDURE — 97140 MANUAL THERAPY 1/> REGIONS: CPT

## 2024-03-11 NOTE — FLOWSHEET NOTE
[facility] - Outpatient Rehabilitation and Therapy [address] [place] [address] office: [phone] fax: [phone]      Physical Therapy: TREATMENT/PROGRESS NOTE   Patient: Jacquie Rehman (18 y.o. female)   Treatment Date: 2024   :  2005 MRN: [id number]   Visit #: 8   Insurance Allowable Auth Needed   Aetna/1 per day []Yes    [x]No    Insurance: Payor: AETNA / Plan: AETNA / Product Type: *No Product type* /   Insurance ID: [id number] - (Commercial)  Secondary Insurance (if applicable): MARBELLA SHI   Treatment Diagnosis: L knee pain M25.562  No diagnosis found.   Medical Diagnosis:    Left knee pain, unspecified chronicity [M25.562]   Referring Physician: Wiley Shaffer MD  PCP: Unknown, Provider, DO                             Plan of care signed: NO    Date of Patient follow up with Physician:      Progress Report/POC: NO  POC update due: (10 visits /OR AUTH LIMITS, whichever is less)  4/10/2024       Preferred Language for Healthcare:   [x]English       []other:    SUBJECTIVE EXAMINATION     Patient Report/Comments: Patient reports that she is sore and tired from her last competition. States that she will work on jumping tonight after practice before competing again this - Fri       Test used Initial score  2024   Pain Summary VAS 1-6/10    Functional questionnaire LEFS 70%    Other:                OBJECTIVE EXAMINATION     Observation: no noted soft tissue restrictions    Test measurements:     Exercises/Interventions:     Therapeutic Ex (06564) 25 resistance Sets/time Reps Notes/Cues/Progressions   Bike  5     Side stepping Red, AK 2 2    Spacer fwd lunges + CL hip extension  10 10    Spacer lateral lunges  10 10    Walking lunges 15 lb 1 2    Spacer lunge with CL hip ext  1 10 Very shaky            10    15    Multi angle lunge with slider       70# left bias ecc phase   Quick step up 12 in  2 15    Manual Intervention (16603) 20  TIME     STM IT

## 2024-03-18 ENCOUNTER — HOSPITAL ENCOUNTER (OUTPATIENT)
Dept: PHYSICAL THERAPY | Age: 19
Setting detail: THERAPIES SERIES
Discharge: HOME | End: 2024-03-18
Attending: ORTHOPAEDIC SURGERY
Payer: COMMERCIAL

## 2024-03-18 PROCEDURE — 97140 MANUAL THERAPY 1/> REGIONS: CPT

## 2024-03-18 PROCEDURE — 97110 THERAPEUTIC EXERCISES: CPT

## 2024-03-18 NOTE — FLOWSHEET NOTE
Adjusted  Patient will be able to return to jumping and diving off board for at least a 60 minute duration with no increase in pain.             Status: [] Progressing: [] Met: [] Not Met: [] Adjusted    Overall Progression Towards Functional goals/ Treatment Progress Update:  [] Patient is progressing as expected towards functional goals listed.    [] Progression is slowed due to complexities/Impairments listed.  [] Progression has been slowed due to co-morbidities.  [x] Plan just implemented, too soon (<30days) to assess goals progression   [] Goals require adjustment due to lack of progress  [] Patient is not progressing as expected and requires additional follow up with physician  [] Other:     CHARGE CAPTURE     PT CHARGE GRID   CPT Code (TIMED) minutes # CPT Code (UNTIMED) #     Therex (80838)  15 1  EVAL:LOW (79294 - Typically 20 minutes face-to-face)     Neuromusc. Re-ed (30624)    Re-Eval (85241)     Manual (86160) 20 1  Estim Unattended (36652)     Ther. Act (77290)    Mech. Traction (92588)     Gait (28634)    Dry Needle 1-2 muscle (81500)     Aquatic Therex (82704)    Dry Needle 3+ muscle (20561)     Iontophoresis (85463)    VASO (35549)     Ultrasound (64674)    Group Therapy (76630)     Estim Attended (09562)    Canalith Repositioning (10865)     Other:    Other:    Total Timed Code Tx Minutes 35 2       Total Treatment Minutes 40        Charge Justification:  (59109) THERAPEUTIC EXERCISE - Provided verbal/tactile cueing for activities related to strengthening, flexibility, endurance, ROM performed to prevent loss of range of motion, maintain or improve muscular strength or increase flexibility, following either an injury or surgery.   (83214) HOME EXERCISE PROGRAM - Reviewed/Progressed HEP activities related to strengthening, flexibility, endurance, ROM performed to prevent loss of range of motion, maintain or improve muscular strength or increase flexibility, following either an injury or

## 2024-03-20 ENCOUNTER — APPOINTMENT (OUTPATIENT)
Dept: PHYSICAL THERAPY | Age: 19
End: 2024-03-20
Attending: ORTHOPAEDIC SURGERY
Payer: COMMERCIAL

## 2024-08-25 ENCOUNTER — NURSE ONLY (OUTPATIENT)
Dept: CARDIOLOGY CLINIC | Age: 19
End: 2024-08-25
Payer: COMMERCIAL

## 2024-08-25 DIAGNOSIS — Z02.5 SPORTS PHYSICAL: Primary | ICD-10-CM

## 2024-08-25 PROCEDURE — 93000 ELECTROCARDIOGRAM COMPLETE: CPT | Performed by: INTERNAL MEDICINE

## (undated) DEVICE — MASTISOL ADHESIVE LIQ 2/3ML

## (undated) DEVICE — STRIP,CLOSURE,WOUND,MEDI-STRIP,1/2X4: Brand: MEDLINE

## (undated) DEVICE — PEN: MARKING STD 100/CS: Brand: MEDICAL ACTION INDUSTRIES

## (undated) DEVICE — Device

## (undated) DEVICE — NEEDLE SPNL 22GA L3.5IN BLK HUB S STL REG WALL FIT STYL W/

## (undated) DEVICE — ZIMMER® STERILE DISPOSABLE TOURNIQUET CUFF WITH PLC, DUAL PORT, SINGLE BLADDER, 24 IN. (61 CM)

## (undated) DEVICE — BANDAGE PATCH BANDAID 1.5X1.5IN

## (undated) DEVICE — APPLICATOR MEDICATED 26 CC SOLUTION HI LT ORNG CHLORAPREP

## (undated) DEVICE — SHEET,DRAPE,53X77,STERILE: Brand: MEDLINE

## (undated) DEVICE — SUTURE MCRYL + SZ 4-0 L27IN ABSRB UD L19MM PS-2 3/8 CIR MCP426H

## (undated) DEVICE — 4.5 MM FULL RADIUS ELITE STRAIGHT                                    DISPOSABLE BLADES, MAROON,PACKAGED 6                                    PER BOX, STERILE

## (undated) DEVICE — GLOVE SURG SZ 75 L12IN FNGR THK79MIL GRN LTX FREE

## (undated) DEVICE — DRAPE,U/ SHT,SPLIT,PLAS,STERIL: Brand: MEDLINE

## (undated) DEVICE — DRESSING,GAUZE,XEROFORM,CURAD,1"X8",ST: Brand: CURAD

## (undated) DEVICE — DYONICS 25 PATIENT TUBE SET MUST                                    BE USED WITH 7211007, 12 PER BOX

## (undated) DEVICE — SYRINGE, LUER LOCK, 10ML: Brand: MEDLINE

## (undated) DEVICE — MASC TURNOVER KIT: Brand: MEDLINE INDUSTRIES, INC.

## (undated) DEVICE — GOWN SIRUS NONREIN XL W/TWL: Brand: MEDLINE INDUSTRIES, INC.

## (undated) DEVICE — BNDG,ELSTC,MATRIX,STRL,6"X5YD,LF,HOOK&LP: Brand: MEDLINE

## (undated) DEVICE — TOWEL,OR,DSP,ST,BLUE,STD,4/PK,20PK/CS: Brand: MEDLINE

## (undated) DEVICE — STERILE SURGICAL BLADE SIZE 15: Brand: CARDINAL HEALTH

## (undated) DEVICE — GLOVE ORANGE PI 7 1/2   MSG9075